# Patient Record
Sex: FEMALE | Race: WHITE | NOT HISPANIC OR LATINO | URBAN - METROPOLITAN AREA
[De-identification: names, ages, dates, MRNs, and addresses within clinical notes are randomized per-mention and may not be internally consistent; named-entity substitution may affect disease eponyms.]

---

## 2017-02-01 ENCOUNTER — OUTPATIENT (OUTPATIENT)
Dept: OUTPATIENT SERVICES | Facility: HOSPITAL | Age: 62
LOS: 1 days | End: 2017-02-01
Payer: COMMERCIAL

## 2017-02-01 PROCEDURE — 85025 COMPLETE CBC W/AUTO DIFF WBC: CPT

## 2017-02-01 PROCEDURE — 93005 ELECTROCARDIOGRAM TRACING: CPT

## 2017-02-01 PROCEDURE — 88321 CONSLTJ&REPRT SLD PREP ELSWR: CPT

## 2017-02-01 PROCEDURE — G0463: CPT

## 2017-02-01 PROCEDURE — 93010 ELECTROCARDIOGRAM REPORT: CPT

## 2017-02-01 PROCEDURE — 80048 BASIC METABOLIC PNL TOTAL CA: CPT

## 2017-02-01 PROCEDURE — 36415 COLL VENOUS BLD VENIPUNCTURE: CPT

## 2017-02-06 ENCOUNTER — TRANSCRIPTION ENCOUNTER (OUTPATIENT)
Age: 62
End: 2017-02-06

## 2017-02-06 ENCOUNTER — OUTPATIENT (OUTPATIENT)
Dept: OUTPATIENT SERVICES | Facility: HOSPITAL | Age: 62
LOS: 1 days | Discharge: HOME | End: 2017-02-06
Payer: COMMERCIAL

## 2017-02-06 PROCEDURE — 19281 PERQ DEVICE BREAST 1ST IMAG: CPT | Mod: RT

## 2017-02-06 PROCEDURE — 88333 PATH CONSLTJ SURG CYTO XM 1: CPT | Mod: 26

## 2017-02-06 PROCEDURE — 88307 TISSUE EXAM BY PATHOLOGIST: CPT | Mod: 26

## 2017-02-06 PROCEDURE — 88305 TISSUE EXAM BY PATHOLOGIST: CPT | Mod: 26

## 2017-02-06 PROCEDURE — 76098 X-RAY EXAM SURGICAL SPECIMEN: CPT | Mod: 26

## 2017-02-07 PROCEDURE — 88305 TISSUE EXAM BY PATHOLOGIST: CPT

## 2017-02-07 PROCEDURE — 14001 TIS TRNFR TRUNK 10.1-30SQCM: CPT

## 2017-02-07 PROCEDURE — 88333 PATH CONSLTJ SURG CYTO XM 1: CPT

## 2017-02-07 PROCEDURE — C1889: CPT

## 2017-02-07 PROCEDURE — 38525 BIOPSY/REMOVAL LYMPH NODES: CPT | Mod: RT

## 2017-02-07 PROCEDURE — 20926: CPT

## 2017-02-07 PROCEDURE — 76098 X-RAY EXAM SURGICAL SPECIMEN: CPT

## 2017-02-07 PROCEDURE — 19301 PARTIAL MASTECTOMY: CPT | Mod: RT

## 2017-02-07 PROCEDURE — 88307 TISSUE EXAM BY PATHOLOGIST: CPT

## 2017-02-07 PROCEDURE — 19281 PERQ DEVICE BREAST 1ST IMAG: CPT

## 2017-03-17 PROBLEM — Z00.00 ENCOUNTER FOR PREVENTIVE HEALTH EXAMINATION: Status: ACTIVE | Noted: 2017-03-17

## 2017-09-12 ENCOUNTER — APPOINTMENT (OUTPATIENT)
Dept: INTERNAL MEDICINE | Facility: CLINIC | Age: 62
End: 2017-09-12
Payer: COMMERCIAL

## 2017-09-12 VITALS
WEIGHT: 112 LBS | HEIGHT: 60 IN | BODY MASS INDEX: 21.99 KG/M2 | SYSTOLIC BLOOD PRESSURE: 100 MMHG | TEMPERATURE: 98.5 F | RESPIRATION RATE: 14 BRPM | OXYGEN SATURATION: 99 % | HEART RATE: 84 BPM | DIASTOLIC BLOOD PRESSURE: 82 MMHG

## 2017-09-12 DIAGNOSIS — Z87.891 PERSONAL HISTORY OF NICOTINE DEPENDENCE: ICD-10-CM

## 2017-09-12 DIAGNOSIS — Z82.5 FAMILY HISTORY OF ASTHMA AND OTHER CHRONIC LOWER RESPIRATORY DISEASES: ICD-10-CM

## 2017-09-12 DIAGNOSIS — Z82.49 FAMILY HISTORY OF ISCHEMIC HEART DISEASE AND OTHER DISEASES OF THE CIRCULATORY SYSTEM: ICD-10-CM

## 2017-09-12 DIAGNOSIS — Z78.9 OTHER SPECIFIED HEALTH STATUS: ICD-10-CM

## 2017-09-12 PROCEDURE — 94729 DIFFUSING CAPACITY: CPT

## 2017-09-12 PROCEDURE — 94060 EVALUATION OF WHEEZING: CPT

## 2017-09-12 PROCEDURE — 99215 OFFICE O/P EST HI 40 MIN: CPT | Mod: 25

## 2017-09-12 PROCEDURE — 94727 GAS DIL/WSHOT DETER LNG VOL: CPT

## 2018-01-29 ENCOUNTER — RX RENEWAL (OUTPATIENT)
Age: 63
End: 2018-01-29

## 2018-04-17 ENCOUNTER — RX RENEWAL (OUTPATIENT)
Age: 63
End: 2018-04-17

## 2018-07-09 ENCOUNTER — RX RENEWAL (OUTPATIENT)
Age: 63
End: 2018-07-09

## 2018-09-18 ENCOUNTER — APPOINTMENT (OUTPATIENT)
Dept: INTERNAL MEDICINE | Facility: CLINIC | Age: 63
End: 2018-09-18
Payer: COMMERCIAL

## 2018-09-18 VITALS
WEIGHT: 112 LBS | SYSTOLIC BLOOD PRESSURE: 106 MMHG | TEMPERATURE: 98.4 F | BODY MASS INDEX: 21.99 KG/M2 | HEART RATE: 76 BPM | OXYGEN SATURATION: 97 % | DIASTOLIC BLOOD PRESSURE: 64 MMHG | RESPIRATION RATE: 18 BRPM | HEIGHT: 60 IN

## 2018-09-18 DIAGNOSIS — C50.919 MALIGNANT NEOPLASM OF UNSPECIFIED SITE OF UNSPECIFIED FEMALE BREAST: ICD-10-CM

## 2018-09-18 DIAGNOSIS — Z17.1 MALIGNANT NEOPLASM OF UNSPECIFIED SITE OF UNSPECIFIED FEMALE BREAST: ICD-10-CM

## 2018-09-18 PROCEDURE — ZZZZZ: CPT

## 2018-09-18 PROCEDURE — 99215 OFFICE O/P EST HI 40 MIN: CPT | Mod: 25

## 2018-09-18 PROCEDURE — 94727 GAS DIL/WSHOT DETER LNG VOL: CPT

## 2018-09-18 PROCEDURE — 94729 DIFFUSING CAPACITY: CPT

## 2018-09-18 PROCEDURE — 94060 EVALUATION OF WHEEZING: CPT

## 2018-09-18 NOTE — HISTORY OF PRESENT ILLNESS
[de-identified] : The patient comes in today for a followup evaluation, and a yearly reassessment of her pulmonary status.\par \par Overall, from a pulmonary standpoint, the patient states that she is doing extremely well. She remains on Singulair, as monotherapy. She has not had any exacerbations of her underlying asthma over the past year. She has not required the use of her rescue inhaler at any time. She denies any allergy mediated symptoms. There has been no breathlessness. She denies any wheezing or chest congestion. There is no cough or sputum production. She denies any significant issues with her sinuses.\par \par The patient has been exercising on a daily basis for at least 30 minutes. She feels quite well in this regard. His stamina is good.\par \par The patient continues to be followed carefully for her stage I, triple-negative breast carcinoma. She completed a course of chemotherapy and radiation. She is now felt to be in remission and is followed closely. She continues to be followed by her primary care physician, Dr. Sherman for all medical issues. She now comes in for this assessment.

## 2018-09-18 NOTE — PHYSICAL EXAM
[General Appearance - Alert] : alert [General Appearance - In No Acute Distress] : in no acute distress [Sclera] : the sclera and conjunctiva were normal [PERRL With Normal Accommodation] : pupils were equal in size, round, and reactive to light [Extraocular Movements] : extraocular movements were intact [Outer Ear] : the ears and nose were normal in appearance [Neck Appearance] : the appearance of the neck was normal [Neck Cervical Mass (___cm)] : no neck mass was observed [Jugular Venous Distention Increased] : there was no jugular-venous distention [Thyroid Diffuse Enlargement] : the thyroid was not enlarged [Thyroid Nodule] : there were no palpable thyroid nodules [Auscultation Breath Sounds / Voice Sounds] : lungs were clear to auscultation bilaterally [Heart Rate And Rhythm] : heart rate was normal and rhythm regular [Heart Sounds] : normal S1 and S2 [Heart Sounds Gallop] : no gallops [Murmurs] : no murmurs [Heart Sounds Pericardial Friction Rub] : no pericardial rub [Full Pulse] : the pedal pulses are present [Edema] : there was no peripheral edema [Bowel Sounds] : normal bowel sounds [Abdomen Soft] : soft [Abdomen Tenderness] : non-tender [Abdomen Mass (___ Cm)] : no abdominal mass palpated [Cervical Lymph Nodes Enlarged Posterior Bilaterally] : posterior cervical [Cervical Lymph Nodes Enlarged Anterior Bilaterally] : anterior cervical [Supraclavicular Lymph Nodes Enlarged Bilaterally] : supraclavicular [No CVA Tenderness] : no ~M costovertebral angle tenderness [Nail Clubbing] : no clubbing  or cyanosis of the fingernails [] : no rash [FreeTextEntry1] : Slight thinning of the hair is noted

## 2018-09-18 NOTE — DATA REVIEWED
[FreeTextEntry1] : A pulmonary function test is performed. Lung volumes and flow rates are within normal limits. Slight dilator reactivity is demonstrated. The DLCO and saturation maintained. This represents normal physiologic function with minimal airway reactivity.

## 2018-09-18 NOTE — PLAN
[FreeTextEntry1] : 1. Continue with Singulair as monotherapy for treatment of her underlying asthma.\par \par 2. Albuterol on an as-needed basis only.\par \par 3. Routine medical followup with Dr. Sherman. He has been administering a pneumococcal vaccine according to the patient. She also needs a flu shot this year.\par \par 4. Continue with oncologic, radiation oncology, and surgical followup regarding her breast cancer.\par \par 5. Follow up with myself in one year with full pulmonary function testing.

## 2018-09-28 ENCOUNTER — RX RENEWAL (OUTPATIENT)
Age: 63
End: 2018-09-28

## 2019-02-12 ENCOUNTER — MEDICATION RENEWAL (OUTPATIENT)
Age: 64
End: 2019-02-12

## 2019-05-29 ENCOUNTER — MEDICATION RENEWAL (OUTPATIENT)
Age: 64
End: 2019-05-29

## 2019-09-16 ENCOUNTER — APPOINTMENT (OUTPATIENT)
Dept: INTERNAL MEDICINE | Facility: CLINIC | Age: 64
End: 2019-09-16
Payer: MEDICARE

## 2019-09-16 ENCOUNTER — NON-APPOINTMENT (OUTPATIENT)
Age: 64
End: 2019-09-16

## 2019-09-16 VITALS
HEIGHT: 60 IN | SYSTOLIC BLOOD PRESSURE: 102 MMHG | RESPIRATION RATE: 16 BRPM | BODY MASS INDEX: 22.38 KG/M2 | TEMPERATURE: 98.3 F | DIASTOLIC BLOOD PRESSURE: 70 MMHG | WEIGHT: 114 LBS | HEART RATE: 76 BPM | OXYGEN SATURATION: 98 %

## 2019-09-16 PROCEDURE — 99215 OFFICE O/P EST HI 40 MIN: CPT | Mod: 25

## 2019-09-16 PROCEDURE — 94060 EVALUATION OF WHEEZING: CPT

## 2019-09-16 NOTE — PHYSICAL EXAM
[General Appearance - Alert] : alert [General Appearance - In No Acute Distress] : in no acute distress [Sclera] : the sclera and conjunctiva were normal [PERRL With Normal Accommodation] : pupils were equal in size, round, and reactive to light [Extraocular Movements] : extraocular movements were intact [Outer Ear] : the ears and nose were normal in appearance [Neck Appearance] : the appearance of the neck was normal [Neck Cervical Mass (___cm)] : no neck mass was observed [Jugular Venous Distention Increased] : there was no jugular-venous distention [Thyroid Diffuse Enlargement] : the thyroid was not enlarged [Thyroid Nodule] : there were no palpable thyroid nodules [Auscultation Breath Sounds / Voice Sounds] : lungs were clear to auscultation bilaterally [Heart Rate And Rhythm] : heart rate was normal and rhythm regular [Heart Sounds] : normal S1 and S2 [Heart Sounds Gallop] : no gallops [Murmurs] : no murmurs [Heart Sounds Pericardial Friction Rub] : no pericardial rub [Full Pulse] : the pedal pulses are present [Edema] : there was no peripheral edema [Bowel Sounds] : normal bowel sounds [Abdomen Soft] : soft [Abdomen Tenderness] : non-tender [Abdomen Mass (___ Cm)] : no abdominal mass palpated [Cervical Lymph Nodes Enlarged Posterior Bilaterally] : posterior cervical [Cervical Lymph Nodes Enlarged Anterior Bilaterally] : anterior cervical [Supraclavicular Lymph Nodes Enlarged Bilaterally] : supraclavicular [No CVA Tenderness] : no ~M costovertebral angle tenderness [Nail Clubbing] : no clubbing  or cyanosis of the fingernails [] : no rash [Normal Gait] : normal gait [Coordination Grossly Intact] : coordination grossly intact [Normal Affect] : the affect was normal [Normal Insight/Judgement] : insight and judgment were intact [FreeTextEntry1] : Slight thinning of the hair is noted

## 2019-09-16 NOTE — HISTORY OF PRESENT ILLNESS
[de-identified] : The patient comes in today for a followup evaluation, and a yearly reassessment of her pulmonary status.\par \par Overall, from a pulmonary standpoint, the patient states that she is doing extremely well. She remains on Singulair 10 mg one tablet per day, as monotherapy. She has not had any exacerbations of her underlying asthma over the past year. She has not required the use of her rescue inhaler at any time. She denies any allergy mediated symptoms. There has been no breathlessness. She denies any wheezing or chest congestion. There is no cough or sputum production. She notes that a couple months ago she had some sinus problems, was prescribed Flonase by her PCP, and it has resolved at this time. \par \par The patient has been exercising on a daily basis for at least 30 minutes include lifting weights occasionally. She feels quite well in this regard. His stamina is good. She denies cardiovascular symptoms with exertion. \par \par The patient recently saw her oncologist Dr. Fernandes, gynecologist, and dermatologist for routine followup visits. She continues to be followed by her primary care physician, Dr. Sherman for all medical issues. She now comes in for this assessment.

## 2019-09-16 NOTE — ADDENDUM
[FreeTextEntry1] : I, Qasim Koehler, acted solely as a scribe for Dr. Moore on this date 09/16/2019.

## 2019-09-16 NOTE — PLAN
[FreeTextEntry1] : 1. Continue with Singulair as monotherapy for treatment of her underlying asthma.\par \par 2. Albuterol on an as-needed basis only.\par \par 3. The patient already had her flu shot for this season. \par \par 4. The new shingles vaccine has been recommended. She will call his insurance company to see if it's covered. \par \par 5. Routine gynecology followup.\par \par 6. Routine oncology follow up with Dr. Fernandes regarding her history of breast cancer. \par \par 7. Routine medical followup with her primary care physician Dr. Sherman.\par \par 8. Follow up with myself in one year with full pulmonary function testing.

## 2019-09-16 NOTE — END OF VISIT
[FreeTextEntry3] : I, Qasim Koehler, acted solely as a scribe for Dr. Dax Moore MD on this date 09/16/2019. \par \par All medical records entries made by the Scribe were at my, Dr. Dax Moore MD, direction and personally dictated by me on 09/16/2019. I have personally reviewed the chart and agree that the record accurately reflects my personal performance of the history, physical exam, assessment, and plan. I have also personally directed, reviewed, and agreed with the chart.

## 2020-11-10 ENCOUNTER — APPOINTMENT (OUTPATIENT)
Dept: INTERNAL MEDICINE | Facility: CLINIC | Age: 65
End: 2020-11-10

## 2020-12-28 ENCOUNTER — APPOINTMENT (OUTPATIENT)
Dept: INTERNAL MEDICINE | Facility: CLINIC | Age: 65
End: 2020-12-28
Payer: MEDICARE

## 2020-12-28 PROCEDURE — 99213 OFFICE O/P EST LOW 20 MIN: CPT | Mod: 95

## 2020-12-28 RX ORDER — ALPRAZOLAM 0.5 MG/1
0.5 TABLET ORAL
Qty: 60 | Refills: 0 | Status: ACTIVE | COMMUNITY
Start: 2020-10-27

## 2020-12-28 RX ORDER — ATORVASTATIN CALCIUM 20 MG/1
20 TABLET, FILM COATED ORAL
Qty: 90 | Refills: 0 | Status: ACTIVE | COMMUNITY
Start: 2020-12-15

## 2020-12-28 NOTE — ASSESSMENT
[FreeTextEntry1] : \par \par Continue with Singulair. \par Exercise regularly\par \par FU with PFT in 6 months.  \par \par Call any symptoms or concerns.

## 2020-12-28 NOTE — HISTORY OF PRESENT ILLNESS
[Home] : at home, [unfilled] , at the time of the visit. [Medical Office: ([facility])___] : at the medical office located in  [Verbal consent obtained from patient] : the patient, [unfilled] [FreeTextEntry8] : Pulm FU \par Doing well in regards to her pulmonary status. Feels great.   Compliant with Singulair.  Has never required use of rescue inhaler.  Exercing daily without asthma symptoms.  Allergies well controlled.  Follows with  for Breast Ca and PCP .  Denies cough, wheeze, SOB.

## 2021-08-03 ENCOUNTER — APPOINTMENT (OUTPATIENT)
Dept: INTERNAL MEDICINE | Facility: CLINIC | Age: 66
End: 2021-08-03
Payer: MEDICARE

## 2021-08-03 VITALS
TEMPERATURE: 98.6 F | DIASTOLIC BLOOD PRESSURE: 74 MMHG | RESPIRATION RATE: 18 BRPM | SYSTOLIC BLOOD PRESSURE: 120 MMHG | BODY MASS INDEX: 24.14 KG/M2 | WEIGHT: 115 LBS | HEART RATE: 92 BPM | HEIGHT: 58 IN | OXYGEN SATURATION: 99 %

## 2021-08-03 PROCEDURE — 99215 OFFICE O/P EST HI 40 MIN: CPT | Mod: 25

## 2021-08-03 PROCEDURE — 94727 GAS DIL/WSHOT DETER LNG VOL: CPT

## 2021-08-03 PROCEDURE — 94060 EVALUATION OF WHEEZING: CPT

## 2021-08-03 PROCEDURE — 94729 DIFFUSING CAPACITY: CPT

## 2021-08-03 PROCEDURE — ZZZZZ: CPT

## 2021-08-03 NOTE — DATA REVIEWED
[FreeTextEntry1] : A pulmonary function test is performed. Lung volumes are within normal limits with a minimal decrease in residual volume. Lung mechanics revealed a moderate decrease in the FEF 25-75. Moderate bronchodilator activity is demonstrated. The DLCO and saturation are well maintained. This represents a mild to moderate degree of obstructive lung disease with airway reactivity. This is consistent with the patient's clinical diagnosis of asthma. When compared to the prior study, the flow rates are relatively stable except for a decrease in the FEF 25-75.

## 2021-08-03 NOTE — HISTORY OF PRESENT ILLNESS
[FreeTextEntry1] : The patient comes in for a yearly pulmonary evaluation\par  [de-identified] : The patient has not been seen by myself, since September of 2019. She remains compliant with her maintenance therapy which includes montelukast 10 mg daily. In the past 2 years, she has not had any exacerbations of her underlying asthma. She states that she did have one mild upper respiratory infection but she did not trigger or flare her asthma symptoms. She denies any cough, wheeze, sputum production at this time. She has not required the use of her rescue inhaler. She does not have nocturnal awakenings.\par \par With regards to her underlying chronic sinusitis, she again remains asymptomatic. She has not had any flareups. She has not had any allergy symptoms at present. She has been exercising without difficulty. She now comes in for this assessment

## 2021-08-03 NOTE — PLAN
[FreeTextEntry1] : One. Continue with regimen as outlined above.\par \par 2. Maintain cardiovascular exercise regimen.\par \par 3. Routine medical followup with her primary care physician, Dr. Sherman\par \par 4. Follow up with myself in one year with full pulmonary function testing

## 2021-12-29 ENCOUNTER — RX RENEWAL (OUTPATIENT)
Age: 66
End: 2021-12-29

## 2022-08-10 ENCOUNTER — TRANSCRIPTION ENCOUNTER (OUTPATIENT)
Age: 67
End: 2022-08-10

## 2022-08-10 ENCOUNTER — APPOINTMENT (OUTPATIENT)
Dept: INTERNAL MEDICINE | Facility: CLINIC | Age: 67
End: 2022-08-10

## 2022-08-10 VITALS
HEART RATE: 84 BPM | BODY MASS INDEX: 24.98 KG/M2 | WEIGHT: 119 LBS | HEIGHT: 58 IN | TEMPERATURE: 98.7 F | SYSTOLIC BLOOD PRESSURE: 110 MMHG | OXYGEN SATURATION: 97 % | DIASTOLIC BLOOD PRESSURE: 78 MMHG

## 2022-08-10 PROCEDURE — 94060 EVALUATION OF WHEEZING: CPT

## 2022-08-10 PROCEDURE — ZZZZZ: CPT

## 2022-08-10 PROCEDURE — 94727 GAS DIL/WSHOT DETER LNG VOL: CPT

## 2022-08-10 PROCEDURE — 99215 OFFICE O/P EST HI 40 MIN: CPT | Mod: 25

## 2022-08-10 PROCEDURE — 94729 DIFFUSING CAPACITY: CPT

## 2022-08-10 NOTE — PLAN
[FreeTextEntry1] : 1.  Continue with medical regimen as outlined above.\par \par 2.  Maintain cardiovascular exercise.\par \par 3.  Follow-up with myself in 1 year with full pulmonary function testing.\par \par 4.  Routine medical follow-up with her primary care physician, Dr. Brewer.

## 2022-08-10 NOTE — HISTORY OF PRESENT ILLNESS
[FreeTextEntry1] : The patient comes in for a yearly pulmonary evaluation\par  [de-identified] : The patient states, that from a pulmonary standpoint, she continues to do extremely well.  She remains compliant with her maintenance regimen of montelukast as monotherapy.  She has not required the use of her albuterol.  She has not had any exacerbations.  She denies any shortness of breath, cough, or wheeze.  She does not have nocturnal awakenings.\par \par The patient has not had any issues with her sinuses.  She has not had any recent infections.  She is asymptomatic in this regard.  She is not having any allergy mediated symptoms.  She exercises by walking at least 10,000 steps per day.  She does not have any breathlessness.  She comes in for this assessment.

## 2022-08-10 NOTE — DATA REVIEWED
[FreeTextEntry1] : Pulmonary function test is performed.  Lung volumes are within normal limits with a minimal decrease in the residual volume.  Lung mechanics are minimal decrease in the FEF 25–75.  Mild to moderate bronchodilator reactivity is demonstrated.  The DLCO and saturation are maintained.  This represents a mild degree of obstruction with airway reactivity.  When compared to the prior study, the FEV1 and FEF 25–75, have both improved.

## 2023-03-16 ENCOUNTER — RX RENEWAL (OUTPATIENT)
Age: 68
End: 2023-03-16

## 2023-09-26 ENCOUNTER — APPOINTMENT (OUTPATIENT)
Dept: INTERNAL MEDICINE | Facility: CLINIC | Age: 68
End: 2023-09-26
Payer: MEDICARE

## 2023-09-26 VITALS
OXYGEN SATURATION: 97 % | DIASTOLIC BLOOD PRESSURE: 78 MMHG | WEIGHT: 118 LBS | SYSTOLIC BLOOD PRESSURE: 112 MMHG | TEMPERATURE: 98 F | BODY MASS INDEX: 24.77 KG/M2 | HEART RATE: 87 BPM | RESPIRATION RATE: 18 BRPM | HEIGHT: 58 IN

## 2023-09-26 DIAGNOSIS — Z88.9 ALLERGY STATUS TO UNSPECIFIED DRUGS, MEDICAMENTS AND BIOLOGICAL SUBSTANCES: ICD-10-CM

## 2023-09-26 DIAGNOSIS — Z23 ENCOUNTER FOR IMMUNIZATION: ICD-10-CM

## 2023-09-26 DIAGNOSIS — Z87.891 PERSONAL HISTORY OF NICOTINE DEPENDENCE: ICD-10-CM

## 2023-09-26 DIAGNOSIS — J32.9 CHRONIC SINUSITIS, UNSPECIFIED: ICD-10-CM

## 2023-09-26 DIAGNOSIS — J45.30 MILD PERSISTENT ASTHMA, UNCOMPLICATED: ICD-10-CM

## 2023-09-26 DIAGNOSIS — J98.01 ACUTE BRONCHOSPASM: ICD-10-CM

## 2023-09-26 PROCEDURE — ZZZZZ: CPT

## 2023-09-26 PROCEDURE — 94729 DIFFUSING CAPACITY: CPT

## 2023-09-26 PROCEDURE — 99215 OFFICE O/P EST HI 40 MIN: CPT | Mod: 25

## 2023-09-26 PROCEDURE — 94727 GAS DIL/WSHOT DETER LNG VOL: CPT

## 2023-09-26 PROCEDURE — 94060 EVALUATION OF WHEEZING: CPT

## 2023-09-26 RX ORDER — ALBUTEROL SULFATE 90 UG/1
108 (90 BASE) INHALANT RESPIRATORY (INHALATION)
Qty: 1 | Refills: 3 | Status: ACTIVE | COMMUNITY
Start: 1900-01-01 | End: 1900-01-01

## 2024-06-25 RX ORDER — MONTELUKAST 10 MG/1
10 TABLET, FILM COATED ORAL
Qty: 90 | Refills: 3 | Status: ACTIVE | COMMUNITY
Start: 2018-04-17 | End: 1900-01-01

## 2024-10-01 ENCOUNTER — APPOINTMENT (OUTPATIENT)
Dept: INTERNAL MEDICINE | Facility: CLINIC | Age: 69
End: 2024-10-01
Payer: MEDICARE

## 2024-10-01 VITALS
OXYGEN SATURATION: 99 % | TEMPERATURE: 97.7 F | DIASTOLIC BLOOD PRESSURE: 77 MMHG | SYSTOLIC BLOOD PRESSURE: 124 MMHG | HEART RATE: 77 BPM | HEIGHT: 58 IN | RESPIRATION RATE: 18 BRPM | BODY MASS INDEX: 25.4 KG/M2 | WEIGHT: 121 LBS

## 2024-10-01 DIAGNOSIS — Z23 ENCOUNTER FOR IMMUNIZATION: ICD-10-CM

## 2024-10-01 DIAGNOSIS — Z87.891 PERSONAL HISTORY OF NICOTINE DEPENDENCE: ICD-10-CM

## 2024-10-01 DIAGNOSIS — Z88.9 ALLERGY STATUS TO UNSPECIFIED DRUGS, MEDICAMENTS AND BIOLOGICAL SUBSTANCES: ICD-10-CM

## 2024-10-01 DIAGNOSIS — J98.01 ACUTE BRONCHOSPASM: ICD-10-CM

## 2024-10-01 DIAGNOSIS — J45.30 MILD PERSISTENT ASTHMA, UNCOMPLICATED: ICD-10-CM

## 2024-10-01 DIAGNOSIS — J32.9 CHRONIC SINUSITIS, UNSPECIFIED: ICD-10-CM

## 2024-10-01 PROCEDURE — G0009: CPT

## 2024-10-01 PROCEDURE — 94060 EVALUATION OF WHEEZING: CPT

## 2024-10-01 PROCEDURE — 99215 OFFICE O/P EST HI 40 MIN: CPT | Mod: 25

## 2024-10-01 PROCEDURE — 94727 GAS DIL/WSHOT DETER LNG VOL: CPT

## 2024-10-01 PROCEDURE — 90677 PCV20 VACCINE IM: CPT

## 2024-10-01 PROCEDURE — 94729 DIFFUSING CAPACITY: CPT

## 2024-10-01 NOTE — ADDENDUM
[FreeTextEntry1] : This note was written by Erin Lorenzo on 10/01/2024 acting as a medical scribe for Dr. Dax Moore MD. All medical entries made by the scribe were at my, Dr. Dax Moore's, direction and personally dictated by me on 10/01/2024. I have reviewed the chart and agree that the record accurately reflects my personal performance of the history, review of systems, assessment, and plan. I have also personally directed, reviewed, and agreed with the chart.

## 2024-10-01 NOTE — PLAN
[FreeTextEntry1] : 1. Continue current medications as outlined above.  2. The patient received the Prevnar 20 vaccine in office today, 10/1/24.    3. Follow up in 1 year with PFT.    4. Routine medical follow-up with her primary care physician, Dr. Sherman.    5. The COVID vaccine is recommended in the fall.   6. Maintain exercise regimen as tolerated.

## 2024-10-01 NOTE — DATA REVIEWED
[FreeTextEntry1] : A pulmonary function test is performed.  Lung volumes are within normal limits except for slight decrease in the residual volume and FRC.  Lung mechanics reveal a mild to moderate decrease in flow rates.  Mild to moderate bronchodilator reactivity is demonstrated.  The DLCO and saturation are maintained.  This represents a mild to moderate degree of obstruction.  Airway reactivity is noted, consistent with the patient's clinical diagnosis of asthma.  When compared to the prior study, the FEV1 has increased slightly.

## 2024-10-01 NOTE — HISTORY OF PRESENT ILLNESS
[FreeTextEntry1] :  The patient comes in for a comprehensive pulmonary evaluation. [de-identified] : The patient is feeling well from a pulmonary standpoint. The patient has a history of mild persistent asthma for which she is maintained on Montelukast 10 MG once daily. There has been no cough, sputum production, or wheeze. There have been no recent exacerbations of the patient's asthma. She has not required the use of her Albuterol rescue inhaler in approximately 15 years.   She denies any sinus or allergy related symptoms. She denies any purulent nasal secretions at this time. She does not use any inhaled nasal corticosteroids. She reports that she received the Flu vaccine last month.   The patient reports that she has been exercising more often. There has been no chest pain, shortness of breath, palpitations, or PND.  There have been no fevers, chills, or night sweats. There have been no other acute constitutional symptoms. She comes in for this assessment.

## 2024-10-01 NOTE — PHYSICAL EXAM
[General Appearance - Alert] : alert [General Appearance - In No Acute Distress] : in no acute distress [Sclera] : the sclera and conjunctiva were normal [PERRL With Normal Accommodation] : pupils were equal in size, round, and reactive to light [Extraocular Movements] : extraocular movements were intact [Outer Ear] : the ears and nose were normal in appearance [Neck Appearance] : the appearance of the neck was normal [Neck Cervical Mass (___cm)] : no neck mass was observed [Jugular Venous Distention Increased] : there was no jugular-venous distention [Thyroid Diffuse Enlargement] : the thyroid was not enlarged [Thyroid Nodule] : there were no palpable thyroid nodules [Auscultation Breath Sounds / Voice Sounds] : lungs were clear to auscultation bilaterally [Heart Rate And Rhythm] : heart rate was normal and rhythm regular [Heart Sounds] : normal S1 and S2 [Heart Sounds Gallop] : no gallops [Murmurs] : no murmurs [Heart Sounds Pericardial Friction Rub] : no pericardial rub [Full Pulse] : the pedal pulses are present [Edema] : there was no peripheral edema [Bowel Sounds] : normal bowel sounds [Abdomen Soft] : soft [Abdomen Tenderness] : non-tender [Abdomen Mass (___ Cm)] : no abdominal mass palpated [Cervical Lymph Nodes Enlarged Posterior Bilaterally] : posterior cervical [Cervical Lymph Nodes Enlarged Anterior Bilaterally] : anterior cervical [Supraclavicular Lymph Nodes Enlarged Bilaterally] : supraclavicular [No CVA Tenderness] : no ~M costovertebral angle tenderness [Nail Clubbing] : no clubbing  or cyanosis of the fingernails [] : no rash [FreeTextEntry1] : Slight thinning of the hair is noted [Coordination Grossly Intact] : coordination grossly intact [Normal Gait] : normal gait [Normal Affect] : the affect was normal [Normal Insight/Judgement] : insight and judgment were intact